# Patient Record
Sex: MALE | Race: WHITE | Employment: FULL TIME | URBAN - METROPOLITAN AREA
[De-identification: names, ages, dates, MRNs, and addresses within clinical notes are randomized per-mention and may not be internally consistent; named-entity substitution may affect disease eponyms.]

---

## 2023-10-31 ENCOUNTER — APPOINTMENT (OUTPATIENT)
Dept: GENERAL RADIOLOGY | Age: 23
End: 2023-10-31
Payer: COMMERCIAL

## 2023-10-31 ENCOUNTER — HOSPITAL ENCOUNTER (EMERGENCY)
Age: 23
Discharge: HOME | End: 2023-10-31
Payer: COMMERCIAL

## 2023-10-31 VITALS
RESPIRATION RATE: 16 BRPM | TEMPERATURE: 98.7 F | SYSTOLIC BLOOD PRESSURE: 135 MMHG | WEIGHT: 180 LBS | OXYGEN SATURATION: 100 % | HEART RATE: 75 BPM | BODY MASS INDEX: 28.93 KG/M2 | DIASTOLIC BLOOD PRESSURE: 87 MMHG | HEIGHT: 66 IN

## 2023-10-31 DIAGNOSIS — M25.571 ACUTE BILATERAL ANKLE PAIN: Primary | ICD-10-CM

## 2023-10-31 DIAGNOSIS — M25.572 ACUTE BILATERAL ANKLE PAIN: Primary | ICD-10-CM

## 2023-10-31 PROCEDURE — 6370000000 HC RX 637 (ALT 250 FOR IP): Performed by: PHYSICIAN ASSISTANT

## 2023-10-31 PROCEDURE — 73610 X-RAY EXAM OF ANKLE: CPT

## 2023-10-31 PROCEDURE — 99283 EMERGENCY DEPT VISIT LOW MDM: CPT

## 2023-10-31 RX ORDER — ESCITALOPRAM OXALATE 20 MG/1
20 TABLET ORAL DAILY
COMMUNITY

## 2023-10-31 RX ORDER — ACETAMINOPHEN 500 MG
1000 TABLET ORAL ONCE
Status: COMPLETED | OUTPATIENT
Start: 2023-10-31 | End: 2023-10-31

## 2023-10-31 RX ORDER — IBUPROFEN 800 MG/1
800 TABLET ORAL ONCE
Status: COMPLETED | OUTPATIENT
Start: 2023-10-31 | End: 2023-10-31

## 2023-10-31 RX ADMIN — IBUPROFEN 800 MG: 800 TABLET, FILM COATED ORAL at 00:46

## 2023-10-31 RX ADMIN — ACETAMINOPHEN 1000 MG: 500 TABLET ORAL at 00:46

## 2023-10-31 ASSESSMENT — PAIN DESCRIPTION - ORIENTATION
ORIENTATION: RIGHT;LEFT
ORIENTATION: RIGHT;LEFT

## 2023-10-31 ASSESSMENT — LIFESTYLE VARIABLES
HOW MANY STANDARD DRINKS CONTAINING ALCOHOL DO YOU HAVE ON A TYPICAL DAY: 1 OR 2
HOW OFTEN DO YOU HAVE A DRINK CONTAINING ALCOHOL: 2-3 TIMES A WEEK

## 2023-10-31 ASSESSMENT — PAIN DESCRIPTION - PAIN TYPE: TYPE: ACUTE PAIN

## 2023-10-31 ASSESSMENT — PAIN DESCRIPTION - LOCATION
LOCATION: ANKLE
LOCATION: ANKLE

## 2023-10-31 ASSESSMENT — PAIN DESCRIPTION - DESCRIPTORS: DESCRIPTORS: SHARP;SHOOTING;ACHING

## 2023-10-31 ASSESSMENT — PAIN SCALES - GENERAL: PAINLEVEL_OUTOF10: 9

## 2023-10-31 ASSESSMENT — PAIN - FUNCTIONAL ASSESSMENT: PAIN_FUNCTIONAL_ASSESSMENT: 0-10
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
## 2023-11-02 ENCOUNTER — TELEPHONE (OUTPATIENT)
Dept: ORTHOPEDIC SURGERY | Age: 23
End: 2023-11-02

## 2023-11-02 NOTE — TELEPHONE ENCOUNTER
General Question     Subject: Teeteerene Owen completion request  Patient and /or Facility Request: [email or web address]  Contact Number: [phone]       *JENNIFER - [email or web address] [email or web address] calling to request the clinic PLEASE complete a Teeteerene Owen at 11.03.2023 appointment. Please advise.

## 2023-11-03 ENCOUNTER — OFFICE VISIT (OUTPATIENT)
Dept: ORTHOPEDIC SURGERY | Age: 23
End: 2023-11-03

## 2023-11-03 VITALS — WEIGHT: 180 LBS | HEIGHT: 66 IN | BODY MASS INDEX: 28.93 KG/M2

## 2023-11-03 DIAGNOSIS — M25.572 ACUTE BILATERAL ANKLE PAIN: Primary | ICD-10-CM

## 2023-11-03 DIAGNOSIS — S82.864A CLOSED NONDISPLACED MAISONNEUVE FRACTURE OF RIGHT LOWER EXTREMITY, INITIAL ENCOUNTER: ICD-10-CM

## 2023-11-03 DIAGNOSIS — S93.402A SPRAIN OF LEFT ANKLE, UNSPECIFIED LIGAMENT, INITIAL ENCOUNTER: ICD-10-CM

## 2023-11-03 DIAGNOSIS — S82.391A: ICD-10-CM

## 2023-11-03 DIAGNOSIS — S82.434A CLOSED NONDISPLACED OBLIQUE FRACTURE OF SHAFT OF RIGHT FIBULA, INITIAL ENCOUNTER: ICD-10-CM

## 2023-11-03 DIAGNOSIS — M25.571 ACUTE BILATERAL ANKLE PAIN: Primary | ICD-10-CM

## 2023-11-04 NOTE — PROGRESS NOTES
Social Determinants of Health     Financial Resource Strain: Not on file   Food Insecurity: Not on file   Transportation Needs: Not on file   Physical Activity: Not on file   Stress: Not on file   Social Connections: Not on file   Intimate Partner Violence: Not on file   Housing Stability: Not on file       Review of Systems: Please see today's intake form for complete review of systems. PHYSICAL EXAM  Gen: awake, alert, oriented  HEENT: atraumatic, normocephalic  Neck: supple  Resp: equal chest rise bilaterally, no audible wheezes, no accessory muscle use. CV: Lower extremities warm and well perfused. Abd: soft, nontender   Focused examination of the right lower extremity: No cuts, wounds, or abrasions. Moderate lower extremity swelling focal to the malleolus region. There is tenderness to palpation along the medial malleolus. There is minor tenderness about the anterolateral ankle in the region of the syndesmosis. There is minor tenderness to palpation about the upper fibular shaft. Ankle range of motion is 5 degrees dorsiflexion to 50 degrees plantarflexion without difficulty. No limitation to subtalar motion. Sensation is intact to light touch in deep peroneal, superficial peroneal, tibial, sural, and saphenous nerve distributions. Motor function is intact to EHL, FHL, tibialis anterior, and gastroc. There is brisk capillary refill to the toes and a strong palpable dorsalis pedis pulse. Compartments are soft and compressible. There is no calf tenderness and a negative Homans' sign. Focused examination of the left lower extremity: No cuts, wounds, or abrasions. Moderate lower extremity swelling focal to the malleolus region. There is minor tenderness about the anterolateral ankle in the region of the syndesmosis. Ankle range of motion is 5 degrees dorsiflexion to 50 degrees plantarflexion without difficulty. No limitation to subtalar motion.  Sensation is intact to light touch in deep

## 2023-12-01 ENCOUNTER — OFFICE VISIT (OUTPATIENT)
Dept: ORTHOPEDIC SURGERY | Age: 23
End: 2023-12-01

## 2023-12-01 VITALS — BODY MASS INDEX: 28.93 KG/M2 | WEIGHT: 180 LBS | HEIGHT: 66 IN

## 2023-12-01 DIAGNOSIS — M25.571 ACUTE BILATERAL ANKLE PAIN: Primary | ICD-10-CM

## 2023-12-01 DIAGNOSIS — M25.572 ACUTE BILATERAL ANKLE PAIN: Primary | ICD-10-CM

## 2023-12-06 NOTE — PROGRESS NOTES
ORTHOPAEDIC SURGERY FOLLOWUP VISIT    CHIEF COMPLAINT:  Bilateral lower extremity injuries    DATE OF INJURY: 10/31/2023    HISTORY OF PRESENT ILLNESS:  25-year-old male presents for repeat evaluation of bilateral lower extremity injuries 4 weeks ago. He sustained a Maisonneuve type injury to the right ankle as well as an ankle sprain to the left. He indicates that his right lower extremity is pain-free. He has minor pain in the left ankle related to the additional burden from nonweightbearing restrictions on the right leg. He has been using OTC medications as needed for pain. He has remained off work at this time. PHYSICAL EXAM:  General: Well-appearing. No distress. Focused examination of the right lower extremity: No cuts, wounds, or abrasions. Mild lower extremity swelling focal to the malleolus region. There is no significant tenderness to palpation along the medial malleolus. There is minor tenderness about the anterolateral ankle in the region of the syndesmosis. There is no tenderness to palpation about the upper fibular shaft. Ankle range of motion is 5 degrees dorsiflexion to 50 degrees plantarflexion without difficulty. No limitation to subtalar motion. Sensation is intact to light touch in deep peroneal, superficial peroneal, tibial, sural, and saphenous nerve distributions. Motor function is intact to EHL, FHL, tibialis anterior, and gastroc. There is brisk capillary refill to the toes and a strong palpable dorsalis pedis pulse. Compartments are soft and compressible. There is no calf tenderness and a negative Homans' sign. Focused examination of the left lower extremity: No cuts, wounds, or abrasions. Moderate lower extremity swelling focal to the malleolus region. There is minor tenderness about the anterolateral ankle in the region of the syndesmosis. Ankle range of motion is 5 degrees dorsiflexion to 50 degrees plantarflexion without difficulty.   No limitation to subtalar

## 2023-12-06 NOTE — PATIENT INSTRUCTIONS
Wire to sleep in the boot. He was provided with rehabilitation exercises to perform for both lower extremities.

## 2024-01-05 ENCOUNTER — OFFICE VISIT (OUTPATIENT)
Dept: ORTHOPEDIC SURGERY | Age: 24
End: 2024-01-05

## 2024-01-05 VITALS — WEIGHT: 180 LBS | HEIGHT: 66 IN | BODY MASS INDEX: 28.93 KG/M2

## 2024-01-05 DIAGNOSIS — S82.864D CLOSED NONDISPLACED MAISONNEUVE FRACTURE OF RIGHT LOWER EXTREMITY WITH ROUTINE HEALING, SUBSEQUENT ENCOUNTER: Primary | ICD-10-CM

## 2024-01-05 DIAGNOSIS — S82.434D CLOSED NONDISPLACED OBLIQUE FRACTURE OF SHAFT OF RIGHT FIBULA WITH ROUTINE HEALING, SUBSEQUENT ENCOUNTER: ICD-10-CM

## 2024-01-05 DIAGNOSIS — S82.391D CLOSED TRAUMATIC NONDISPLACED FRACTURE OF POSTERIOR MALLEOLUS OF RIGHT TIBIA WITH ROUTINE HEALING, SUBSEQUENT ENCOUNTER: ICD-10-CM

## 2024-01-05 DIAGNOSIS — M25.572 ACUTE BILATERAL ANKLE PAIN: ICD-10-CM

## 2024-01-05 DIAGNOSIS — M25.571 ACUTE BILATERAL ANKLE PAIN: ICD-10-CM

## 2024-01-12 ENCOUNTER — TELEPHONE (OUTPATIENT)
Dept: ORTHOPEDIC SURGERY | Age: 24
End: 2024-01-12

## 2024-01-15 ENCOUNTER — HOSPITAL ENCOUNTER (OUTPATIENT)
Dept: PHYSICAL THERAPY | Age: 24
Setting detail: THERAPIES SERIES
Discharge: HOME | End: 2024-01-15
Payer: COMMERCIAL

## 2024-01-15 PROCEDURE — 97140 MANUAL THERAPY 1/> REGIONS: CPT

## 2024-01-15 PROCEDURE — 97161 PT EVAL LOW COMPLEX 20 MIN: CPT

## 2024-01-15 PROCEDURE — 97110 THERAPEUTIC EXERCISES: CPT

## 2024-01-15 NOTE — FLOWSHEET NOTE
[facility]- Outpatient Rehabilitation and Therapy [address], [place] [address] office: [phone] fax: [phone]      Physical Therapy: TREATMENT/PROGRESS NOTE   Patient: Brien Guajardo (23 y.o. male)   Treatment Date: 01/15/2024   :  2000 MRN: [id number]   Visit #: 1    Insurance: Payor: ESIS / Plan: ESIS / Product Type: *No Product type* /   Insurance ID: [id number] - (Worker's Comp)  Secondary Insurance (if applicable):    Treatment Diagnosis: Bilateral ankle pain   Medical Diagnosis:       Closed traumatic nondisplaced fracture of posterior malleolus of right tibia with routine healing, subsequent encounter [S82.391D]  Closed nondisplaced Maisonneuve fracture of right lower extremity with routine healing, subsequent encounter [S82.864D]  Closed nondisplaced oblique fracture of shaft of right fibula with routine healing, subsequent encounter [S82.434D]  Acute bilateral ankle pain [M25.571, M25.572]   Referring Physician: Héctor Hummel MD  PCP: No primary care provider on file.                             Plan of care signed (Y/N):     Date of Patient follow up with Physician:      Progress Report Due: 2/15/24    POC due: 4/15/24     Visit # Insurance Allowable Auth Needed    12- no end date []Yes    []No     Latex Allergy:  [x]NO      []YES   Preferred Language for Healthcare:   [x]English       []other:    Assessment Summary: Brien Guajardo is a 23 y.o. male presenting today to Outpatient PT with primary complaint of bilateral ankle pain which has been occurring since end of October after fall off ladder. Pt is noted to have reduced left ankle ROM. Strength is normal. Mild gait deviation.     SUBJECTIVE EXAMINATION     Patient Report/Comments: see eval    OBJECTIVE EXAMINATION     Observation:     Test measurements:      Test used Initial score 01/15/2024   Pain Summary VAS 4    Functional questionnaire LEFS 26%

## 2024-01-15 NOTE — PLAN OF CARE
[facility]- Outpatient Rehabilitation and Therapy [address], [place] [address] office: [phone] fax: [phone]     Physical Therapy Certification      Dear Héctor Hummel MD,    We had the pleasure of evaluating the following patient for physical therapy services at [facility].  A summary of our findings can be found in the initial assessment below.  This includes our plan of care.  If you have any questions or concerns regarding these findings, please do not hesitate to contact me at the office phone number listed above.  Thank you for the referral.     Physician Signature:_______________________________Date:__________________  By signing above (or electronic signature), therapist’s plan is approved by physician       Physical Therapy: Initial Evaluation   Patient: Brien Guajardo (23 y.o. male)   Examination Date: 01/15/2024   :  2000 MRN: [id number]   Visit #: 1    Insurance: Payor: ESIS / Plan: ESIS / Product Type: *No Product type* /   Insurance ID: [id number] - (Worker's Comp)  Secondary Insurance (if applicable):    Treatment Diagnosis: Bilateral ankle pain     Medical Diagnosis:  Closed traumatic nondisplaced fracture of posterior malleolus of right tibia with routine healing, subsequent encounter [S82.706D]  Closed nondisplaced Maisonneuve fracture of right lower extremity with routine healing, subsequent encounter [S82.604D]  Closed nondisplaced oblique fracture of shaft of right fibula with routine healing, subsequent encounter [S82.647D]  Acute bilateral ankle pain [M25.571, M25.572]   Referring Physician: Héctor uHmmel MD  PCP: No primary care provider on file.                              Precautions/ Contra-indications:   Latex allergy:   [x] NO      [] YES   Pacemaker:     [x] NO      [] YES   Other:     C-SSRS Triggered by Intake questionnaire (Past 2 wk assessment):   [x] No, Questionnaire did not trigger

## 2024-01-19 ENCOUNTER — HOSPITAL ENCOUNTER (OUTPATIENT)
Dept: PHYSICAL THERAPY | Age: 24
Setting detail: THERAPIES SERIES
Discharge: HOME | End: 2024-01-19
Payer: COMMERCIAL

## 2024-01-19 PROCEDURE — 97110 THERAPEUTIC EXERCISES: CPT

## 2024-01-19 PROCEDURE — 97140 MANUAL THERAPY 1/> REGIONS: CPT

## 2024-01-19 PROCEDURE — 97112 NEUROMUSCULAR REEDUCATION: CPT

## 2024-01-23 ENCOUNTER — HOSPITAL ENCOUNTER (OUTPATIENT)
Dept: PHYSICAL THERAPY | Age: 24
Setting detail: THERAPIES SERIES
Discharge: HOME | End: 2024-01-23
Payer: COMMERCIAL

## 2024-01-23 PROCEDURE — 97112 NEUROMUSCULAR REEDUCATION: CPT

## 2024-01-23 PROCEDURE — 97140 MANUAL THERAPY 1/> REGIONS: CPT

## 2024-01-23 PROCEDURE — 97110 THERAPEUTIC EXERCISES: CPT

## 2024-01-23 NOTE — FLOWSHEET NOTE
[facility]- Outpatient Rehabilitation and Therapy [address], [place] [address] office: [phone] fax: [phone]      Physical Therapy: TREATMENT/PROGRESS NOTE   Patient: Brien Guajardo (23 y.o. male)   Treatment Date: 2024   :  2000 MRN: [id number]   Visit #: 3    Insurance: Payor: ESIS / Plan: ESIS / Product Type: *No Product type* /   Insurance ID: [id number] - (Worker's Comp)  Secondary Insurance (if applicable):    Treatment Diagnosis: Bilateral ankle pain   Medical Diagnosis:       Closed traumatic nondisplaced fracture of posterior malleolus of right tibia with routine healing, subsequent encounter [S82.391D]  Closed nondisplaced Maisonneuve fracture of right lower extremity with routine healing, subsequent encounter [S82.864D]  Closed nondisplaced oblique fracture of shaft of right fibula with routine healing, subsequent encounter [S82.434D]  Acute bilateral ankle pain [M25.571, M25.572]   Referring Physician: Héctor Hummel MD  PCP: No primary care provider on file.                             Plan of care signed (Y/N):     Date of Patient follow up with Physician:      Progress Report Due: 2/15/24    POC due: 4/15/24     Visit # Insurance Allowable Auth Needed   3/12 12- no end date []Yes    []No     Latex Allergy:  [x]NO      []YES   Preferred Language for Healthcare:   [x]English       []other:    Assessment Summary: Brien Guajardo is a 23 y.o. male presenting today to Outpatient PT with primary complaint of bilateral ankle pain which has been occurring since end of October after fall off ladder. Pt is noted to have reduced left ankle ROM. Strength is normal. Mild gait deviation.     SUBJECTIVE EXAMINATION     Patient Report/Comments: Pt states right ankle is a little sore. Overall has been doing fine.     OBJECTIVE EXAMINATION     Observation:     Test measurements:      Test used Initial score 2024   Pain Summary VAS 4 1

## 2024-01-26 ENCOUNTER — HOSPITAL ENCOUNTER (OUTPATIENT)
Dept: PHYSICAL THERAPY | Age: 24
Setting detail: THERAPIES SERIES
Discharge: HOME | End: 2024-01-26
Payer: COMMERCIAL

## 2024-01-26 PROCEDURE — 97110 THERAPEUTIC EXERCISES: CPT

## 2024-01-26 PROCEDURE — 97112 NEUROMUSCULAR REEDUCATION: CPT

## 2024-01-26 NOTE — FLOWSHEET NOTE
[facility]- Outpatient Rehabilitation and Therapy [address], [place] [address] office: [phone] fax: [phone]      Physical Therapy: TREATMENT/PROGRESS NOTE   Patient: Brien Guajardo (23 y.o. male)   Treatment Date: 2024   :  2000 MRN: [id number]   Visit #: 4    Insurance: Payor: ESIS / Plan: ESIS / Product Type: *No Product type* /   Insurance ID: [id number] - (Worker's Comp)  Secondary Insurance (if applicable):    Treatment Diagnosis: Bilateral ankle pain   Medical Diagnosis:       Closed traumatic nondisplaced fracture of posterior malleolus of right tibia with routine healing, subsequent encounter [S82.391D]  Closed nondisplaced Maisonneuve fracture of right lower extremity with routine healing, subsequent encounter [S82.864D]  Closed nondisplaced oblique fracture of shaft of right fibula with routine healing, subsequent encounter [S82.434D]  Acute bilateral ankle pain [M25.571, M25.572]   Referring Physician: Héctor Hummel MD  PCP: No primary care provider on file.                             Plan of care signed (Y/N):     Date of Patient follow up with Physician:      Progress Report Due: 2/15/24    POC due: 4/15/24     Visit # Insurance Allowable Auth Needed    12- no end date []Yes    []No     Latex Allergy:  [x]NO      []YES   Preferred Language for Healthcare:   [x]English       []other:    Assessment Summary: Brien Guajardo is a 23 y.o. male presenting today to Outpatient PT with primary complaint of bilateral ankle pain which has been occurring since end of October after fall off ladder. Pt is noted to have reduced left ankle ROM. Strength is normal. Mild gait deviation.     SUBJECTIVE EXAMINATION     Patient Report/Comments: Pt states ankles are a little sore but getting stronger.     OBJECTIVE EXAMINATION     Observation:     Test measurements:      Test used Initial score 2024   Pain Summary VAS 4 2   Functional

## 2024-01-30 ENCOUNTER — HOSPITAL ENCOUNTER (OUTPATIENT)
Dept: PHYSICAL THERAPY | Age: 24
Setting detail: THERAPIES SERIES
Discharge: HOME | End: 2024-01-30
Payer: COMMERCIAL

## 2024-02-02 ENCOUNTER — HOSPITAL ENCOUNTER (OUTPATIENT)
Dept: PHYSICAL THERAPY | Age: 24
Setting detail: THERAPIES SERIES
End: 2024-02-02
Payer: COMMERCIAL

## 2024-02-06 ENCOUNTER — HOSPITAL ENCOUNTER (OUTPATIENT)
Dept: PHYSICAL THERAPY | Age: 24
Setting detail: THERAPIES SERIES
Discharge: HOME | End: 2024-02-06
Payer: COMMERCIAL

## 2024-02-06 PROCEDURE — 97110 THERAPEUTIC EXERCISES: CPT

## 2024-02-06 PROCEDURE — 97016 VASOPNEUMATIC DEVICE THERAPY: CPT

## 2024-02-06 PROCEDURE — 97112 NEUROMUSCULAR REEDUCATION: CPT

## 2024-02-06 NOTE — FLOWSHEET NOTE
impairments and/or activity limitations and would benefit from continued outpatient therapy services to address the deficits outlined in the patients goals            GOALS     Patient stated goal: Return to work  Status: [] Progressing: [] Met: [] Not Met: [] Adjusted     Therapist goals for Patient:   Short Term Goals: To be achieved in: 4 weeks  Independent in HEP and progression per patient tolerance, in order to progress toward full function and prevent re-injury.               Status: [] Progressing: [] Met: [] Not Met: [] Adjusted  Patient will have a decrease in pain to 1/10 to help  facilitate improvement in movement, function, and ADLs as indicated by functional deficits.              Status: [] Progressing: [] Met: [] Not Met: [] Adjusted     Long Term Goals: To be achieved in: 12 weeks  Disability index score of 6% or less for the LEFS to assist with return top prior level of function.                  Status: [] Progressing: [] Met: [] Not Met: [] Adjusted  Pt will improve left ankle DF/INV/EVR to 6/30/20  to allow for proper joint functioning as indicated by patients functional deficits.  Status: [] Progressing: [] Met: [] Not Met: [] Adjusted  Pt will be able to complete 15 SL heel raises  Status: [] Progressing: [] Met: [] Not Met: [] Adjusted  Patient will return to Usual work, housework or activities without increased symptoms or restriction to work towards return to prior level of function.                                  Status: [] Progressing: [] Met: [] Not Met: [] Adjusted  Pt will be able to ascend/descend ladder without limitation.                                                                                                          Status: [] Progressing: [] Met: [] Not Met: [] Adjusted       CHARGE CAPTURE     Time in: 8:20 PM    Time out: 9:15 PM    CPT Code (TIMED) minutes # CPT Code (UNTIMED) #     Therex 05200)    [phone]   EVAL:LOW (59730 - Typically 20 minutes face-to-face)

## 2024-02-09 ENCOUNTER — HOSPITAL ENCOUNTER (OUTPATIENT)
Dept: PHYSICAL THERAPY | Age: 24
Setting detail: THERAPIES SERIES
Discharge: HOME | End: 2024-02-09
Payer: COMMERCIAL

## 2024-02-09 PROCEDURE — 97110 THERAPEUTIC EXERCISES: CPT

## 2024-02-09 PROCEDURE — 97112 NEUROMUSCULAR REEDUCATION: CPT

## 2024-02-09 PROCEDURE — 97016 VASOPNEUMATIC DEVICE THERAPY: CPT

## 2024-02-09 NOTE — FLOWSHEET NOTE
[facility]- Outpatient Rehabilitation and Therapy [address], [place] [address] office: [phone] fax: [phone]      Physical Therapy: TREATMENT/PROGRESS NOTE   Patient: Brien Guajardo (23 y.o. male)   Treatment Date: 2024   :  2000 MRN: [id number]   Visit #: 6    Insurance: Payor: ESIS / Plan: ESIS / Product Type: *No Product type* /   Insurance ID: [id number] - (Worker's Comp)  Secondary Insurance (if applicable):    Treatment Diagnosis: Bilateral ankle pain   Medical Diagnosis:       Closed traumatic nondisplaced fracture of posterior malleolus of right tibia with routine healing, subsequent encounter [S82.391D]  Closed nondisplaced Maisonneuve fracture of right lower extremity with routine healing, subsequent encounter [S82.864D]  Closed nondisplaced oblique fracture of shaft of right fibula with routine healing, subsequent encounter [S82.434D]  Acute bilateral ankle pain [M25.571, M25.572]   Referring Physician: Héctor Hummel MD  PCP: No primary care provider on file.                             Plan of care signed (Y/N):     Date of Patient follow up with Physician:      Progress Report Due: 2/15/24    POC due: 4/15/24     Visit # Insurance Allowable Auth Needed    12- no end date []Yes    []No     Latex Allergy:  [x]NO      []YES   Preferred Language for Healthcare:   [x]English       []other:    Assessment Summary: Brien Guajardo is a 23 y.o. male presenting today to Outpatient PT with primary complaint of bilateral ankle pain which has been occurring since end of October after fall off ladder. Pt is noted to have reduced left ankle ROM. Strength is normal. Mild gait deviation.     SUBJECTIVE EXAMINATION     Patient Report/Comments: Pt states left ankle is sore, right one feels great.     OBJECTIVE EXAMINATION     Observation:     Test measurements:      Test used Initial score 2024   Pain Summary VAS 4 3   Functional

## 2024-02-13 ENCOUNTER — HOSPITAL ENCOUNTER (OUTPATIENT)
Dept: PHYSICAL THERAPY | Age: 24
Setting detail: THERAPIES SERIES
Discharge: HOME | End: 2024-02-13
Payer: COMMERCIAL

## 2024-02-13 PROCEDURE — 97112 NEUROMUSCULAR REEDUCATION: CPT

## 2024-02-13 PROCEDURE — 97110 THERAPEUTIC EXERCISES: CPT

## 2024-02-13 NOTE — FLOWSHEET NOTE
justification of therapy services:  The patient has functional impairments and/or activity limitations and would benefit from continued outpatient therapy services to address the deficits outlined in the patients goals            GOALS     Patient stated goal: Return to work  Status: [] Progressing: [] Met: [] Not Met: [] Adjusted     Therapist goals for Patient:   Short Term Goals: To be achieved in: 4 weeks  Independent in HEP and progression per patient tolerance, in order to progress toward full function and prevent re-injury.               Status: [] Progressing: [] Met: [] Not Met: [] Adjusted  Patient will have a decrease in pain to 1/10 to help  facilitate improvement in movement, function, and ADLs as indicated by functional deficits.              Status: [] Progressing: [] Met: [] Not Met: [] Adjusted     Long Term Goals: To be achieved in: 12 weeks  Disability index score of 6% or less for the LEFS to assist with return top prior level of function.                  Status: [] Progressing: [] Met: [] Not Met: [] Adjusted  Pt will improve left ankle DF/INV/EVR to 6/30/20  to allow for proper joint functioning as indicated by patients functional deficits.  Status: [] Progressing: [] Met: [] Not Met: [] Adjusted  Pt will be able to complete 15 SL heel raises  Status: [] Progressing: [] Met: [] Not Met: [] Adjusted  Patient will return to Usual work, housework or activities without increased symptoms or restriction to work towards return to prior level of function.                                  Status: [] Progressing: [] Met: [] Not Met: [] Adjusted  Pt will be able to ascend/descend ladder without limitation.                                                                                                          Status: [] Progressing: [] Met: [] Not Met: [] Adjusted       CHARGE CAPTURE     Time in: 8:20 PM    Time out: 9:20 PM    CPT Code (TIMED) minutes # CPT Code (UNTIMED) #     Therex (13422)

## 2024-02-16 ENCOUNTER — HOSPITAL ENCOUNTER (OUTPATIENT)
Dept: PHYSICAL THERAPY | Age: 24
Setting detail: THERAPIES SERIES
Discharge: HOME | End: 2024-02-16
Payer: COMMERCIAL

## 2024-02-16 PROCEDURE — 97112 NEUROMUSCULAR REEDUCATION: CPT

## 2024-02-16 PROCEDURE — 97110 THERAPEUTIC EXERCISES: CPT

## 2024-02-16 PROCEDURE — 97016 VASOPNEUMATIC DEVICE THERAPY: CPT

## 2024-02-16 NOTE — FLOWSHEET NOTE
session well. Addition of more dynamic tasks with inversion eversion step ups. Continued vaso to help    Medical Necessity Documentation:  I certify that this patient meets the below criteria necessary for medical necessity for care and/or justification of therapy services:  The patient has functional impairments and/or activity limitations and would benefit from continued outpatient therapy services to address the deficits outlined in the patients goals            GOALS     Patient stated goal: Return to work  Status: [] Progressing: [] Met: [] Not Met: [] Adjusted     Therapist goals for Patient:   Short Term Goals: To be achieved in: 4 weeks  Independent in HEP and progression per patient tolerance, in order to progress toward full function and prevent re-injury.               Status: [] Progressing: [x] Met: [] Not Met: [] Adjusted  Patient will have a decrease in pain to 1/10 to help  facilitate improvement in movement, function, and ADLs as indicated by functional deficits.              Status: [x] Progressing: [] Met: [] Not Met: [] Adjusted     Long Term Goals: To be achieved in: 12 weeks  Disability index score of 6% or less for the LEFS to assist with return top prior level of function.                  Status: [] Progressing: [] Met: [] Not Met: [] Adjusted  Pt will improve left ankle DF/INV/EVR to 6/30/20  to allow for proper joint functioning as indicated by patients functional deficits.  Status: [] Progressing: [] Met: [] Not Met: [] Adjusted  Pt will be able to complete 15 SL heel raises  Status: [] Progressing: [] Met: [] Not Met: [] Adjusted  Patient will return to Usual work, housework or activities without increased symptoms or restriction to work towards return to prior level of function.                                  Status: [] Progressing: [] Met: [] Not Met: [] Adjusted  Pt will be able to ascend/descend ladder without limitation.

## 2024-02-20 ENCOUNTER — HOSPITAL ENCOUNTER (OUTPATIENT)
Dept: PHYSICAL THERAPY | Age: 24
Setting detail: THERAPIES SERIES
End: 2024-02-20
Payer: COMMERCIAL

## 2024-02-23 ENCOUNTER — HOSPITAL ENCOUNTER (OUTPATIENT)
Dept: PHYSICAL THERAPY | Age: 24
Setting detail: THERAPIES SERIES
Discharge: HOME | End: 2024-02-23
Payer: COMMERCIAL

## 2024-02-23 PROCEDURE — 97110 THERAPEUTIC EXERCISES: CPT

## 2024-02-23 PROCEDURE — 97112 NEUROMUSCULAR REEDUCATION: CPT

## 2024-02-23 NOTE — PROGRESS NOTES
[facility] - Orthopaedics and Sports Rehabilitation, [facility]  [address] [place] [address]  Phone: [phone]   Fax: [phone]    Date: 2024          Patient Name; :  Brien Guajardo; 2000   Diagnosis: Closed traumatic nondisplaced fracture of posterior malleolus of right tibia with routine healing, subsequent encounter [S82.391D]  Closed nondisplaced Maisonneuve fracture of right lower extremity with routine healing, subsequent encounter [S82.064D]  Closed nondisplaced oblique fracture of shaft of right fibula with routine healing, subsequent encounter [S82.434D]  Acute bilateral ankle pain [M25.571, M25.572]    Physician: Héctor Hummel MD        Total PT Visits: 9     Measures Previous Current   Pain (0-10)            ROM:  Dorsiflexion: 8  Plantarflexion: 50   Inversion: 34  Eversion: 10        SL Heelraises: x15 B    Assessment:  ROM is improved from eval. Strength is improving. Making nice progress towards goals. Still reporting some discomfort with left ankle.       Prognosis for POC: [x] Good [] Fair  [] Poor      Patient requires continued skilled intervention: [x] Yes  [] No              Plan & Recommendations:  [x] Continue rehabilitation due to objective improvement and continued functional deficits with frequency and duration:   [] Progress toward  []GAP, []Work Conditioning, []Independent HEP   [] Discharge due to   [] All goals achieved, [] Maximized \"medical necessity\" [] No subjective or objective improvements      Electronically signed by:  Oliver Packer, PT  Therapy Plan of Care Re-Certification  This patient has been re-evaluated for physical therapy services and for therapy to continue, Medicare, Medicaid and other insurances require periodic physician review of the treatment plan. Please review the above re-evaluation and verify that you agree with plan of care as established above by signing the attached document and return it to our office or

## 2024-02-23 NOTE — FLOWSHEET NOTE
[facility]- Outpatient Rehabilitation and Therapy [address], [place] [address] office: [phone] fax: [phone]      Physical Therapy: TREATMENT/PROGRESS NOTE   Patient: Brien Guajardo (23 y.o. male)   Treatment Date: 2024   :  2000 MRN: [id number]   Visit #: 9    Insurance: Payor: ESIS / Plan: ESIS / Product Type: *No Product type* /   Insurance ID: [id number] - (Worker's Comp)  Secondary Insurance (if applicable):    Treatment Diagnosis: Bilateral ankle pain   Medical Diagnosis:       Closed traumatic nondisplaced fracture of posterior malleolus of right tibia with routine healing, subsequent encounter [S82.391D]  Closed nondisplaced Maisonneuve fracture of right lower extremity with routine healing, subsequent encounter [S82.864D]  Closed nondisplaced oblique fracture of shaft of right fibula with routine healing, subsequent encounter [S82.434D]  Acute bilateral ankle pain [M25.571, M25.572]   Referring Physician: Héctor Hummel MD  PCP: No primary care provider on file.                             Plan of care signed (Y/N):     Date of Patient follow up with Physician:      Progress Report Due: 2/15/24    POC due: 4/15/24     Visit # Insurance Allowable Auth Needed    12- no end date []Yes    []No     Latex Allergy:  [x]NO      []YES   Preferred Language for Healthcare:   [x]English       []other:    Assessment Summary: Brien Guajardo is a 23 y.o. male presenting today to Outpatient PT with primary complaint of bilateral ankle pain which has been occurring since end of October after fall off ladder. Pt is noted to have reduced left ankle ROM. Strength is normal. Mild gait deviation.     SUBJECTIVE EXAMINATION     Patient Report/Comments: Pt states left ankle is a little more sore today.     OBJECTIVE EXAMINATION     Observation:     Test measurements:      Test used Initial score 2024   Pain Summary VAS 4 2   Functional questionnaire

## 2024-02-27 ENCOUNTER — HOSPITAL ENCOUNTER (OUTPATIENT)
Dept: PHYSICAL THERAPY | Age: 24
Setting detail: THERAPIES SERIES
Discharge: HOME | End: 2024-02-27
Payer: COMMERCIAL

## 2024-02-28 ENCOUNTER — OFFICE VISIT (OUTPATIENT)
Dept: ORTHOPEDIC SURGERY | Age: 24
End: 2024-02-28

## 2024-02-28 VITALS — HEIGHT: 66 IN | BODY MASS INDEX: 28.93 KG/M2 | WEIGHT: 180 LBS

## 2024-02-28 DIAGNOSIS — S82.391D CLOSED TRAUMATIC NONDISPLACED FRACTURE OF POSTERIOR MALLEOLUS OF RIGHT TIBIA WITH ROUTINE HEALING, SUBSEQUENT ENCOUNTER: Primary | ICD-10-CM

## 2024-02-28 DIAGNOSIS — S82.864D CLOSED NONDISPLACED MAISONNEUVE FRACTURE OF RIGHT LOWER EXTREMITY WITH ROUTINE HEALING, SUBSEQUENT ENCOUNTER: ICD-10-CM

## 2024-02-29 NOTE — PROGRESS NOTES
ORTHOPAEDIC SURGERY FOLLOWUP VISIT     CHIEF COMPLAINT:  Bilateral lower extremity injuries     DATE OF INJURY: 10/31/2023     HISTORY OF PRESENT ILLNESS:  22-year-old male presents for repeat evaluation of bilateral lower extremity injuries.  He is now approximately 4 months post injury.  He sustained a Maisonneuve type injury to the right ankle as well as an ankle sprain to the left.  He indicates that his right lower extremity is mostly pain-free.  He has minor pain in the left ankle.  This is mostly across the anterior ankle, which she feels is still limiting.  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  His biggest concern is uneven ground with the left ankle.  He has been using OTC medications as needed for pain.  He has remained off work at this time.      PHYSICAL EXAM:  General: Well-appearing.  No distress.  Focused examination of the right lower extremity: No cuts, wounds, or abrasions.  No appreciable swelling.  There is no significant tenderness to palpation along the medial malleolus.  There is no significant tenderness about the anterolateral ankle in the region of the syndesmosis.  There is no tenderness to palpation about the upper fibular shaft.  Ankle range of motion is 5 degrees dorsiflexion to 50 degrees plantarflexion without difficulty.  No limitation to subtalar motion. Sensation is intact to light touch in deep peroneal, superficial peroneal, tibial, sural, and saphenous nerve distributions.  Motor function is intact to EHL, FHL, tibialis anterior, and gastroc.  There is brisk capillary refill to the toes and a strong palpable dorsalis pedis pulse.  Compartments are soft and compressible.  There is no calf tenderness and a negative Homans' sign.     Focused examination of the left lower extremity: No cuts, wounds, or abrasions.  There is minor tenderness across the anterior ankle.  No significant tenderness along the course of

## 2024-03-01 ENCOUNTER — HOSPITAL ENCOUNTER (OUTPATIENT)
Dept: PHYSICAL THERAPY | Age: 24
Setting detail: THERAPIES SERIES
Discharge: HOME | End: 2024-03-01

## 2024-04-16 ENCOUNTER — OFFICE VISIT (OUTPATIENT)
Dept: ORTHOPEDIC SURGERY | Age: 24
End: 2024-04-16

## 2024-04-16 DIAGNOSIS — S82.864D CLOSED NONDISPLACED MAISONNEUVE FRACTURE OF RIGHT LOWER EXTREMITY WITH ROUTINE HEALING, SUBSEQUENT ENCOUNTER: ICD-10-CM

## 2024-04-16 DIAGNOSIS — M76.822 POSTERIOR TIBIAL TENDONITIS, LEFT: Primary | ICD-10-CM

## 2024-04-16 DIAGNOSIS — S82.391D CLOSED TRAUMATIC NONDISPLACED FRACTURE OF POSTERIOR MALLEOLUS OF RIGHT TIBIA WITH ROUTINE HEALING, SUBSEQUENT ENCOUNTER: ICD-10-CM

## 2024-04-16 NOTE — PROGRESS NOTES
ORTHOPAEDIC SURGERY FOLLOWUP VISIT     CHIEF COMPLAINT:  Bilateral lower extremity injuries     DATE OF INJURY: 10/31/2023     HISTORY OF PRESENT ILLNESS:  22-year-old male presents for repeat evaluation of bilateral lower extremity injuries.  He is now approximately 6 months post injury.  He sustained a Maisonneuve type injury to the right ankle as well as a soft tissue injury to the left ankle.  He indicates that his right lower extremity is mostly pain-free.  He has moderate in the left ankle.  This is mostly along the medial ankle.  He has returned to work full duty, but feels he is limited by the left medial ankle pain.  He reports significant increase in pain with walking on uneven surfaces.      PHYSICAL EXAM:  General: Well-appearing.  No distress.     Focused examination of the left lower extremity: No cuts, wounds, or abrasions.  There is minor tenderness across the anterior ankle.  There is moderate tenderness along the course of the posterior tibial tendon.  No tenderness along peroneal tendons.  There is pain with subtalar motion localizing to the posterior tibial tendon.  Ankle range of motion is 5 degrees dorsiflexion to 50 degrees plantarflexion without difficulty.  No limitation to subtalar motion. Sensation is intact to light touch in deep peroneal, superficial peroneal, tibial, sural, and saphenous nerve distributions.  Motor function is intact to EHL, FHL, tibialis anterior, and gastroc.  There is brisk capillary refill to the toes and a strong palpable dorsalis pedis pulse.  Compartments are soft and compressible.  There is no calf tenderness and a negative Homans' sign.     Gait: Observed ambulating with smooth nonantalgic gait in regular footwear without assist devices.     RADIOGRAPHIC EXAM:  3 views of the right ankle including AP, mortise, lateral projections and full-length AP and lateral tibia/fibula images demonstrate anatomically positioned ankle mortise.  Fracture about the fibular

## 2024-05-29 ENCOUNTER — TELEPHONE (OUTPATIENT)
Dept: ORTHOPEDIC SURGERY | Age: 24
End: 2024-05-29

## 2024-05-29 NOTE — TELEPHONE ENCOUNTER
Medical Facility Question     Facility Name: [facility]  Contact Name: DARIANA  Contact Number: [phone] EXT [phone]  Request or Information: REFAX MRI    DARIANA WITH [facility] CALLED TO RESCHEDULE MRI REFAXED. SHE STATED THAT THE ORIGINAL MRI ORDER-DX CODE IS FOR THE RT ANKLE.    DARIANA PROVIDED [phone]    PLE[facility]

## 2024-06-07 ENCOUNTER — OFFICE VISIT (OUTPATIENT)
Dept: ORTHOPEDIC SURGERY | Age: 24
End: 2024-06-07

## 2024-06-07 VITALS — BODY MASS INDEX: 28.93 KG/M2 | HEIGHT: 66 IN | WEIGHT: 180 LBS

## 2024-06-07 DIAGNOSIS — M76.822 POSTERIOR TIBIAL TENDINITIS, LEFT: ICD-10-CM

## 2024-06-07 DIAGNOSIS — S82.864D CLOSED NONDISPLACED MAISONNEUVE FRACTURE OF RIGHT LOWER EXTREMITY WITH ROUTINE HEALING, SUBSEQUENT ENCOUNTER: ICD-10-CM

## 2024-06-07 DIAGNOSIS — S93.402S SEVERE ANKLE SPRAIN, LEFT, SEQUELA: Primary | ICD-10-CM

## 2025-02-17 ENCOUNTER — TELEPHONE (OUTPATIENT)
Dept: ORTHOPEDIC SURGERY | Age: 25
End: 2025-02-17

## 2025-05-03 ENCOUNTER — HOSPITAL ENCOUNTER (OUTPATIENT)
Age: 25
Setting detail: OBSERVATION
Discharge: HOME | End: 2025-05-03
Attending: EMERGENCY MEDICINE | Admitting: INTERNAL MEDICINE
Payer: COMMERCIAL

## 2025-05-03 ENCOUNTER — APPOINTMENT (OUTPATIENT)
Dept: CT IMAGING | Age: 25
End: 2025-05-03
Payer: COMMERCIAL

## 2025-05-03 VITALS
BODY MASS INDEX: 31.18 KG/M2 | TEMPERATURE: 98.4 F | WEIGHT: 194 LBS | HEART RATE: 61 BPM | RESPIRATION RATE: 16 BRPM | SYSTOLIC BLOOD PRESSURE: 120 MMHG | DIASTOLIC BLOOD PRESSURE: 79 MMHG | HEIGHT: 66 IN | OXYGEN SATURATION: 100 %

## 2025-05-03 DIAGNOSIS — R51.9 ACUTE INTRACTABLE HEADACHE, UNSPECIFIED HEADACHE TYPE: Primary | ICD-10-CM

## 2025-05-03 PROBLEM — G43.919 INTRACTABLE MIGRAINE, UNSPECIFIED MIGRAINE TYPE: Status: ACTIVE | Noted: 2025-05-03

## 2025-05-03 LAB
ALBUMIN SERPL-MCNC: 4.2 G/DL (ref 3.4–5)
ALBUMIN/GLOB SERPL: 1.2 {RATIO} (ref 1.1–2.2)
ALP SERPL-CCNC: 95 U/L (ref 40–129)
ALT SERPL-CCNC: 77 U/L (ref 10–40)
AMPHETAMINES UR QL SCN>1000 NG/ML: ABNORMAL
ANION GAP SERPL CALCULATED.3IONS-SCNC: 14 MMOL/L (ref 3–16)
AST SERPL-CCNC: 39 U/L (ref 15–37)
BARBITURATES UR QL SCN>200 NG/ML: POSITIVE
BASOPHILS # BLD: 0 K/UL (ref 0–0.2)
BASOPHILS NFR BLD: 0.4 %
BENZODIAZ UR QL SCN>200 NG/ML: ABNORMAL
BILIRUB SERPL-MCNC: 0.5 MG/DL (ref 0–1)
BILIRUB UR QL STRIP.AUTO: NEGATIVE
BUN SERPL-MCNC: 19 MG/DL (ref 7–20)
CALCIUM SERPL-MCNC: 9.8 MG/DL (ref 8.3–10.6)
CANNABINOIDS UR QL SCN>50 NG/ML: ABNORMAL
CHLORIDE SERPL-SCNC: 103 MMOL/L (ref 99–110)
CLARITY UR: CLEAR
CO2 SERPL-SCNC: 21 MMOL/L (ref 21–32)
COCAINE UR QL SCN: ABNORMAL
COLOR UR: YELLOW
CREAT SERPL-MCNC: 0.8 MG/DL (ref 0.9–1.3)
DEPRECATED RDW RBC AUTO: 13.4 % (ref 12.4–15.4)
DRUG SCREEN COMMENT UR-IMP: ABNORMAL
EOSINOPHIL # BLD: 0.1 K/UL (ref 0–0.6)
EOSINOPHIL NFR BLD: 1.4 %
FENTANYL SCREEN, URINE: ABNORMAL
GFR SERPLBLD CREATININE-BSD FMLA CKD-EPI: >90 ML/MIN/{1.73_M2}
GLUCOSE SERPL-MCNC: 116 MG/DL (ref 70–99)
GLUCOSE UR STRIP.AUTO-MCNC: NEGATIVE MG/DL
HCT VFR BLD AUTO: 47.6 % (ref 40.5–52.5)
HGB BLD-MCNC: 16.2 G/DL (ref 13.5–17.5)
HGB UR QL STRIP.AUTO: NEGATIVE
KETONES UR STRIP.AUTO-MCNC: NEGATIVE MG/DL
LEUKOCYTE ESTERASE UR QL STRIP.AUTO: NEGATIVE
LYMPHOCYTES # BLD: 2.2 K/UL (ref 1–5.1)
LYMPHOCYTES NFR BLD: 36.4 %
MCH RBC QN AUTO: 29.5 PG (ref 26–34)
MCHC RBC AUTO-ENTMCNC: 34.1 G/DL (ref 31–36)
MCV RBC AUTO: 86.5 FL (ref 80–100)
METHADONE UR QL SCN>300 NG/ML: ABNORMAL
MONOCYTES # BLD: 0.6 K/UL (ref 0–1.3)
MONOCYTES NFR BLD: 9.8 %
NEUTROPHILS # BLD: 3.1 K/UL (ref 1.7–7.7)
NEUTROPHILS NFR BLD: 52 %
NITRITE UR QL STRIP.AUTO: NEGATIVE
OPIATES UR QL SCN>300 NG/ML: ABNORMAL
OXYCODONE UR QL SCN: ABNORMAL
PCP UR QL SCN>25 NG/ML: ABNORMAL
PH UR STRIP.AUTO: 6 [PH] (ref 5–8)
PH UR STRIP: 6 [PH]
PLATELET # BLD AUTO: 184 K/UL (ref 135–450)
PMV BLD AUTO: 9.4 FL (ref 5–10.5)
POTASSIUM SERPL-SCNC: 4.2 MMOL/L (ref 3.5–5.1)
PROT SERPL-MCNC: 7.6 G/DL (ref 6.4–8.2)
PROT UR STRIP.AUTO-MCNC: NEGATIVE MG/DL
RBC # BLD AUTO: 5.51 M/UL (ref 4.2–5.9)
SODIUM SERPL-SCNC: 138 MMOL/L (ref 136–145)
SP GR UR STRIP.AUTO: 1.02 (ref 1–1.03)
TROPONIN, HIGH SENSITIVITY: <6 NG/L (ref 0–22)
TROPONIN, HIGH SENSITIVITY: <6 NG/L (ref 0–22)
UA COMPLETE W REFLEX CULTURE PNL UR: NORMAL
UA DIPSTICK W REFLEX MICRO PNL UR: NORMAL
URN SPEC COLLECT METH UR: NORMAL
UROBILINOGEN UR STRIP-ACNC: 0.2 E.U./DL
WBC # BLD AUTO: 6 K/UL (ref 4–11)

## 2025-05-03 PROCEDURE — 84484 ASSAY OF TROPONIN QUANT: CPT

## 2025-05-03 PROCEDURE — 96372 THER/PROPH/DIAG INJ SC/IM: CPT

## 2025-05-03 PROCEDURE — 80053 COMPREHEN METABOLIC PANEL: CPT

## 2025-05-03 PROCEDURE — 99284 EMERGENCY DEPT VISIT MOD MDM: CPT

## 2025-05-03 PROCEDURE — 99239 HOSP IP/OBS DSCHRG MGMT >30: CPT | Performed by: NURSE PRACTITIONER

## 2025-05-03 PROCEDURE — G0378 HOSPITAL OBSERVATION PER HR: HCPCS

## 2025-05-03 PROCEDURE — 81003 URINALYSIS AUTO W/O SCOPE: CPT

## 2025-05-03 PROCEDURE — 93005 ELECTROCARDIOGRAM TRACING: CPT | Performed by: EMERGENCY MEDICINE

## 2025-05-03 PROCEDURE — 96376 TX/PRO/DX INJ SAME DRUG ADON: CPT

## 2025-05-03 PROCEDURE — 85025 COMPLETE CBC W/AUTO DIFF WBC: CPT

## 2025-05-03 PROCEDURE — 2580000003 HC RX 258: Performed by: EMERGENCY MEDICINE

## 2025-05-03 PROCEDURE — 96361 HYDRATE IV INFUSION ADD-ON: CPT

## 2025-05-03 PROCEDURE — 80307 DRUG TEST PRSMV CHEM ANLYZR: CPT

## 2025-05-03 PROCEDURE — 96374 THER/PROPH/DIAG INJ IV PUSH: CPT

## 2025-05-03 PROCEDURE — 6360000002 HC RX W HCPCS: Performed by: EMERGENCY MEDICINE

## 2025-05-03 PROCEDURE — 70450 CT HEAD/BRAIN W/O DYE: CPT

## 2025-05-03 PROCEDURE — 6370000000 HC RX 637 (ALT 250 FOR IP): Performed by: EMERGENCY MEDICINE

## 2025-05-03 PROCEDURE — 96375 TX/PRO/DX INJ NEW DRUG ADDON: CPT

## 2025-05-03 PROCEDURE — 36415 COLL VENOUS BLD VENIPUNCTURE: CPT

## 2025-05-03 RX ORDER — 0.9 % SODIUM CHLORIDE 0.9 %
1000 INTRAVENOUS SOLUTION INTRAVENOUS ONCE
Status: COMPLETED | OUTPATIENT
Start: 2025-05-03 | End: 2025-05-03

## 2025-05-03 RX ORDER — ENOXAPARIN SODIUM 100 MG/ML
40 INJECTION SUBCUTANEOUS DAILY
Status: DISCONTINUED | OUTPATIENT
Start: 2025-05-03 | End: 2025-05-03 | Stop reason: HOSPADM

## 2025-05-03 RX ORDER — MAGNESIUM SULFATE 1 G/100ML
1000 INJECTION INTRAVENOUS ONCE
Status: DISCONTINUED | OUTPATIENT
Start: 2025-05-03 | End: 2025-05-03 | Stop reason: HOSPADM

## 2025-05-03 RX ORDER — ONDANSETRON 4 MG/1
4 TABLET, ORALLY DISINTEGRATING ORAL 3 TIMES DAILY PRN
Qty: 21 TABLET | Refills: 0 | Status: SHIPPED | OUTPATIENT
Start: 2025-05-03

## 2025-05-03 RX ORDER — SODIUM CHLORIDE 9 MG/ML
INJECTION, SOLUTION INTRAVENOUS PRN
Status: DISCONTINUED | OUTPATIENT
Start: 2025-05-03 | End: 2025-05-03 | Stop reason: HOSPADM

## 2025-05-03 RX ORDER — SODIUM CHLORIDE 0.9 % (FLUSH) 0.9 %
5-40 SYRINGE (ML) INJECTION PRN
Status: DISCONTINUED | OUTPATIENT
Start: 2025-05-03 | End: 2025-05-03 | Stop reason: HOSPADM

## 2025-05-03 RX ORDER — METOCLOPRAMIDE HYDROCHLORIDE 5 MG/ML
10 INJECTION INTRAMUSCULAR; INTRAVENOUS ONCE
Status: COMPLETED | OUTPATIENT
Start: 2025-05-03 | End: 2025-05-03

## 2025-05-03 RX ORDER — POTASSIUM CHLORIDE 7.45 MG/ML
10 INJECTION INTRAVENOUS PRN
Status: DISCONTINUED | OUTPATIENT
Start: 2025-05-03 | End: 2025-05-03 | Stop reason: HOSPADM

## 2025-05-03 RX ORDER — ONDANSETRON 2 MG/ML
4 INJECTION INTRAMUSCULAR; INTRAVENOUS EVERY 6 HOURS PRN
Status: DISCONTINUED | OUTPATIENT
Start: 2025-05-03 | End: 2025-05-03 | Stop reason: HOSPADM

## 2025-05-03 RX ORDER — HALOPERIDOL 5 MG/ML
5 INJECTION INTRAMUSCULAR ONCE
Status: COMPLETED | OUTPATIENT
Start: 2025-05-03 | End: 2025-05-03

## 2025-05-03 RX ORDER — SODIUM CHLORIDE 0.9 % (FLUSH) 0.9 %
5-40 SYRINGE (ML) INJECTION EVERY 12 HOURS SCHEDULED
Status: DISCONTINUED | OUTPATIENT
Start: 2025-05-03 | End: 2025-05-03 | Stop reason: HOSPADM

## 2025-05-03 RX ORDER — ACETAMINOPHEN 650 MG/1
650 SUPPOSITORY RECTAL EVERY 6 HOURS PRN
Status: DISCONTINUED | OUTPATIENT
Start: 2025-05-03 | End: 2025-05-03 | Stop reason: HOSPADM

## 2025-05-03 RX ORDER — KETOROLAC TROMETHAMINE 15 MG/ML
15 INJECTION, SOLUTION INTRAMUSCULAR; INTRAVENOUS ONCE
Status: COMPLETED | OUTPATIENT
Start: 2025-05-03 | End: 2025-05-03

## 2025-05-03 RX ORDER — MAGNESIUM SULFATE IN WATER 40 MG/ML
2000 INJECTION, SOLUTION INTRAVENOUS PRN
Status: DISCONTINUED | OUTPATIENT
Start: 2025-05-03 | End: 2025-05-03 | Stop reason: HOSPADM

## 2025-05-03 RX ORDER — DIPHENHYDRAMINE HYDROCHLORIDE 50 MG/ML
50 INJECTION, SOLUTION INTRAMUSCULAR; INTRAVENOUS ONCE
Status: COMPLETED | OUTPATIENT
Start: 2025-05-03 | End: 2025-05-03

## 2025-05-03 RX ORDER — POTASSIUM CHLORIDE 1500 MG/1
40 TABLET, EXTENDED RELEASE ORAL PRN
Status: DISCONTINUED | OUTPATIENT
Start: 2025-05-03 | End: 2025-05-03 | Stop reason: HOSPADM

## 2025-05-03 RX ORDER — ACETAMINOPHEN 325 MG/1
650 TABLET ORAL EVERY 6 HOURS PRN
Status: DISCONTINUED | OUTPATIENT
Start: 2025-05-03 | End: 2025-05-03 | Stop reason: HOSPADM

## 2025-05-03 RX ORDER — ONDANSETRON 2 MG/ML
4 INJECTION INTRAMUSCULAR; INTRAVENOUS ONCE
Status: COMPLETED | OUTPATIENT
Start: 2025-05-03 | End: 2025-05-03

## 2025-05-03 RX ORDER — ONDANSETRON 4 MG/1
4 TABLET, ORALLY DISINTEGRATING ORAL EVERY 8 HOURS PRN
Status: DISCONTINUED | OUTPATIENT
Start: 2025-05-03 | End: 2025-05-03 | Stop reason: HOSPADM

## 2025-05-03 RX ORDER — POLYETHYLENE GLYCOL 3350 17 G/17G
17 POWDER, FOR SOLUTION ORAL DAILY PRN
Status: DISCONTINUED | OUTPATIENT
Start: 2025-05-03 | End: 2025-05-03 | Stop reason: HOSPADM

## 2025-05-03 RX ORDER — SUMATRIPTAN 6 MG/.5ML
6 INJECTION, SOLUTION SUBCUTANEOUS ONCE
Status: DISCONTINUED | OUTPATIENT
Start: 2025-05-03 | End: 2025-05-03 | Stop reason: HOSPADM

## 2025-05-03 RX ORDER — LORAZEPAM 2 MG/ML
2 INJECTION INTRAMUSCULAR ONCE
Status: COMPLETED | OUTPATIENT
Start: 2025-05-03 | End: 2025-05-03

## 2025-05-03 RX ORDER — SODIUM CHLORIDE 9 MG/ML
INJECTION, SOLUTION INTRAVENOUS CONTINUOUS
Status: DISCONTINUED | OUTPATIENT
Start: 2025-05-03 | End: 2025-05-03 | Stop reason: HOSPADM

## 2025-05-03 RX ORDER — ESCITALOPRAM OXALATE 10 MG/1
20 TABLET ORAL DAILY
Status: DISCONTINUED | OUTPATIENT
Start: 2025-05-04 | End: 2025-05-03 | Stop reason: HOSPADM

## 2025-05-03 RX ORDER — BUTALBITAL, ACETAMINOPHEN AND CAFFEINE 50; 325; 40 MG/1; MG/1; MG/1
1 TABLET ORAL ONCE
Status: COMPLETED | OUTPATIENT
Start: 2025-05-03 | End: 2025-05-03

## 2025-05-03 RX ADMIN — DIPHENHYDRAMINE HYDROCHLORIDE 50 MG: 50 INJECTION INTRAMUSCULAR; INTRAVENOUS at 12:48

## 2025-05-03 RX ADMIN — SODIUM CHLORIDE 1000 ML: 0.9 INJECTION, SOLUTION INTRAVENOUS at 12:47

## 2025-05-03 RX ADMIN — SODIUM CHLORIDE 1000 ML: 0.9 INJECTION, SOLUTION INTRAVENOUS at 16:04

## 2025-05-03 RX ADMIN — METOCLOPRAMIDE 10 MG: 5 INJECTION, SOLUTION INTRAMUSCULAR; INTRAVENOUS at 12:48

## 2025-05-03 RX ADMIN — LORAZEPAM 2 MG: 2 INJECTION INTRAMUSCULAR; INTRAVENOUS at 13:59

## 2025-05-03 RX ADMIN — ONDANSETRON 4 MG: 2 INJECTION, SOLUTION INTRAMUSCULAR; INTRAVENOUS at 12:48

## 2025-05-03 RX ADMIN — HALOPERIDOL LACTATE 5 MG: 5 INJECTION, SOLUTION INTRAMUSCULAR at 14:46

## 2025-05-03 RX ADMIN — ONDANSETRON 4 MG: 2 INJECTION, SOLUTION INTRAMUSCULAR; INTRAVENOUS at 13:59

## 2025-05-03 RX ADMIN — KETOROLAC TROMETHAMINE 15 MG: 15 INJECTION, SOLUTION INTRAMUSCULAR; INTRAVENOUS at 12:48

## 2025-05-03 RX ADMIN — BUTALBITAL, ACETAMINOPHEN, AND CAFFEINE 1 TABLET: 50; 325; 40 TABLET ORAL at 13:07

## 2025-05-03 ASSESSMENT — PAIN - FUNCTIONAL ASSESSMENT: PAIN_FUNCTIONAL_ASSESSMENT: 0-10

## 2025-05-03 ASSESSMENT — PAIN DESCRIPTION - LOCATION: LOCATION: HEAD

## 2025-05-03 ASSESSMENT — LIFESTYLE VARIABLES
HOW OFTEN DO YOU HAVE A DRINK CONTAINING ALCOHOL: 2-4 TIMES A MONTH
HOW MANY STANDARD DRINKS CONTAINING ALCOHOL DO YOU HAVE ON A TYPICAL DAY: 1 OR 2

## 2025-05-03 ASSESSMENT — PAIN SCALES - GENERAL
PAINLEVEL_OUTOF10: 7
PAINLEVEL_OUTOF10: 7

## 2025-05-03 NOTE — ED NOTES
Pt was previously to be admitted however, pt started feeling better. RYLAND Velez saw pt at bedside and was agreeable to d/c patient. Agustin is in the ED for orders to d/c patient. Orders to d/c acknowledged - AVS printed and proceeding with d/c. Verbal discussion with Agustin, unsure how to change dispo.     Dispo changed to d/c

## 2025-05-03 NOTE — ED PROVIDER NOTES
[facility] EMERGENCY DEPARTMENT    EMERGENCY DEPARTMENT ENCOUNTER        Patient Name: Brien Guajardo  MRN: [id number]  Birthdate 2000  Date of evaluation: 5/3/2025  PCP: No primary care provider on file.  Note Started: 3:21 PM EDT 5/3/25    CHIEF COMPLAINT       Headache (Headache behind right eye started 11am; said he started with numbness in his left hand before that and that sent him into a panic attack; also states he threw up on the way here)      HISTORY OF PRESENT ILLNESS: 1 or more Elements       Brien Guajardo is a 24 y.o. male with history of anxiety presents to the emergency department for evaluation of headache.  Patient reports he started having a headache and felt numbness in his left hand.  Felt like he was having a panic attack.  On the way to the ER, he had an episode of vomiting.  Says he still feels somewhat anxious.  Rates his headache as a 8 out of 10.  Reports taking ibuprofen around 11 AM.  Says the headache is behind the right eye and has been stable since onset. Denies any falls or injuries to head. No blood thinners.  No recent fevers, chills, cough, or congestion.    No other complaints, modifying factors or associated symptoms.     History obtained by the patient unless stated otherwise as above on HPI.   No limitations unless specified as above on HPI.     Past medical history:   Past Medical History:   Diagnosis Date    Anxiety     Asthma        Past surgical history: History reviewed. No pertinent surgical history.    Home medications:   Prior to Admission medications    Medication Sig Start Date End Date Taking? Authorizing Provider   escitalopram (LEXAPRO) 20 MG tablet Take 1 tablet by mouth daily   Yes Provider, MD Meghna       Social history:   Social History     Tobacco Use    Smoking status: Every Day     Types: Cigarettes, E-Cigarettes    Smokeless tobacco: Current     Types: Snuff   Vaping Use    Vaping status: Every Day   Substance Use Topics    Alcohol use: Yes      due to:  2+ SIRS criteria are not met     During the patient's ED course, the patient was given:  Medications   sodium chloride 0.9 % bolus 1,000 mL (0 mLs IntraVENous Stopped 5/3/25 1403)   butalbital-acetaminophen-caffeine (FIORICET, ESGIC) per tablet 1 tablet (1 tablet Oral Given 5/3/25 1307)   ondansetron (ZOFRAN) injection 4 mg (4 mg IntraVENous Given 5/3/25 1248)   metoclopramide (REGLAN) injection 10 mg (10 mg IntraVENous Given 5/3/25 1248)   ketorolac (TORADOL) injection 15 mg (15 mg IntraVENous Given 5/3/25 1248)   diphenhydrAMINE (BENADRYL) injection 50 mg (50 mg IntraVENous Given 5/3/25 1248)   LORazepam (ATIVAN) injection 2 mg (2 mg IntraVENous Given 5/3/25 1359)   ondansetron (ZOFRAN) injection 4 mg (4 mg IntraVENous Given 5/3/25 1359)   haloperidol lactate (HALDOL) injection 5 mg (5 mg IntraMUSCular Given 5/3/25 1446)   sodium chloride 0.9 % bolus 1,000 mL (1,000 mLs IntraVENous New Bag 5/3/25 1604)            I am the Primary Clinician of Record.    FINAL IMPRESSION      1. Acute intractable headache, unspecified headache type          DISPOSITION/PLAN     DISPOSITION Decision To Admit 05/03/2025 04:16:27 PM   DISPOSITION CONDITION Stable           PATIENT REFERRED TO:  No follow-up provider specified.    DISCHARGE MEDICATIONS:  Patient was given scripts for the following medications. I counseled patient how to take these medications:  New Prescriptions    No medications on file       DISCONTINUED MEDICATIONS:  Discontinued Medications    No medications on file       Pt was seen during the COVID 19 pandemic. Appropriate PPE worn by ME during patient encounters. Patient was cared for during a time with constrained hospital bed capacity with nationwide stress on resources and staffing.      (This chart was generated in part by using Dragon Dictation system and may contain errors related to that system including errors in grammar, punctuation, and spelling, as well as words and phrases that may be

## 2025-05-03 NOTE — H&P
[facility] Medicine History & Physical    V 5.1    Date of Admission: 5/3/2025    Date of Service:  Pt seen/examined on      []Admitted to Inpatient with expected LOS greater than two midnights due to medical therapy.  [x]Placed in Observation status.    Chief Admission Complaint:  intractable HA     Presenting Admission History:      Brien Guajardo is a 24 y.o. male with history of anxiety presents to the emergency department for evaluation of headache.  Patient reports he started having a headache and felt numbness in his left hand.  Felt like he was having a panic attack.  On the way to the ER, he had an episode of vomiting.  Says he still feels somewhat anxious.  Rates his headache as a 8 out of 10.  Reports taking ibuprofen around 11 AM.  Says the headache is behind the right eye and has been stable since onset. Denies any falls or injuries to head. No blood thinners.  No recent fevers, chills, cough, or congestion.     Assessment/Plan:        Intractable migraine with aura;     P was treated in the ED and due to prolonged turn around and need for continued treatment at the time HM was asked to admit. Whenpt was about to be transferred upstairs reported that he was feeling much better and wanted to go home.   - CT of brain and labs reviewed  - DC to  home with follow  up care to PCP, Rx zofran at [place]     Discussed management and the need for Hospitalization of the patient w/ the Emergency Department Provider:     Physical Exam Performed:      /79   Pulse 60   Temp 98.3 °F (36.8 °C) (Oral)   Resp 16   Ht 1.676 m (5' 6\")   Wt 88 kg (194 lb)   SpO2 100%   BMI 31.31 kg/m²     General appearance:  No apparent distress, appears stated age and cooperative.  HEENT:  Pupils equal, round, and reactive to light. Conjunctivae/corneas clear.  Respiratory:  Normal respiratory effort. Clear to auscultation bilaterally without Rales/Wheezes/Rhonchi.  Cardiovascular:  Regular rate and rhythm with normal S1/S2 without  murmurs, rubs or gallops.  Abdomen:  Soft, non-tender, non-distended with normal bowel sounds.  Musculoskeletal:  No clubbing, cyanosis or edema bilaterally.  Full range of motion without deformity.  Neurologic:  Neurovascularly intact without any focal sensory/motor deficits. Cranial nerves: II-XII intact, grossly non-focal.  Psychiatric:  Alert and oriented, thought content appropriate, normal insight  Skin:  Skin color, texture, turgor normal.  No rashes or lesions.  Capillary Refill:  Brisk,< 3 seconds   Peripheral Pulses:  +2 palpable, equal bilaterally     Diet: [x]Adult/General  []Cardiac  []Diabetic  []Low Fat  []NPO  []NPO after Midnight  []Other     DVT Prophylaxis: PPX dose LMWH    Code status: []Full  []DNR/CCA  []Limited (DNR/CCA with Do Not Intubate)  []DNRCC  Surrogate Decision Maker:   Name Home Phone Work Phone Mobile Phone Relationship Lgl VIRGEN Lee [phone]   Parent    IRVING [phone]   Other         PT/OT Eval Status: Not yet ordered    Anticipated Discharge Day/Date:      Anticipated Discharge Location: Home    Consults:      IP CONSULT TO HOSPITALIST  IP CONSULT TO NEUROLOGY    I personally have obtained, updated and/or reviewed the patient’s medication list on 5/3/2025   --------------------------------------------------------------------------------------------------------------------------------------------------------------------    Imaging:     CT HEAD WO CONTRAST  Result Date: 5/3/2025  EXAMINATION: CT OF THE HEAD WITHOUT CONTRAST  5/3/2025 2:45 pm TECHNIQUE: CT of the head was performed without the administration of intravenous contrast. Automated exposure control, iterative reconstruction, and/or weight based adjustment of the mA/kV was utilized to reduce the radiation dose to as low as reasonably achievable. COMPARISON: None. HISTORY: ORDERING SYSTEM PROVIDED HISTORY: headache FINDINGS: BRAIN/VENTRICLES: There is no acute intracranial hemorrhage, mass

## 2025-05-03 NOTE — DISCHARGE SUMMARY
Hospital Medicine Discharge Summary    Patient: Brien Guajardo   : 2000     Hospital:  [facility]  Admit Date: 5/3/2025   Discharge Date:   5/3/25   Disposition:  Home   Code status:  Full  Condition at Discharge: Stable  Primary Care Provider: No primary care provider on file.    Admitting Provider: Edith Talavera DO  Discharge Provider: Grecia Velez, APRN - CNP     Discharge Diagnoses:      Active Hospital Problems    Diagnosis     Intractable migraine, unspecified migraine type [G43.919]      Chief Admission Complaint:  intractable HA      Presenting Admission History:       Brien Guajardo is a 24 y.o. male with history of anxiety presents to the emergency department for evaluation of headache.  Patient reports he started having a headache and felt numbness in his left hand.  Felt like he was having a panic attack.  On the way to the ER, he had an episode of vomiting.  Says he still feels somewhat anxious.  Rates his headache as a 8 out of 10.  Reports taking ibuprofen around 11 AM.  Says the headache is behind the right eye and has been stable since onset. Denies any falls or injuries to head. No blood thinners.  No recent fevers, chills, cough, or congestion.      Assessment/Plan:          Intractable migraine with aura;      P was treated in the ED and due to prolonged turn around and need for continued treatment at the time HM was asked to admit. Whenpt was about to be transferred upstairs reported that he was feeling much better and wanted to go home.   - CT of brain and labs reviewed  - DC to  home with follow  up care to PCP, Rx zofran at [place]        Patient Discharge Instructions:      Follow up:    1.  Primary Care Provider No primary care provider on file. in the next 1-2 weeks.      The patient was seen and examined on day of discharge and this discharge summary is in conjunction with any daily progress note from day of discharge. Time spent on discharge: 31 minutes in the

## 2025-05-03 NOTE — ED NOTES
Patient's family came to nurse's station and said that patient is feeling better and that he wants to go home. Dr. Davalos has left for the day. Perfect serve message sent to Dr. Talavera.

## 2025-05-04 LAB
EKG ATRIAL RATE: 67 BPM
EKG DIAGNOSIS: NORMAL
EKG P AXIS: 56 DEGREES
EKG P-R INTERVAL: 148 MS
EKG Q-T INTERVAL: 410 MS
EKG QRS DURATION: 92 MS
EKG QTC CALCULATION (BAZETT): 433 MS
EKG R AXIS: 57 DEGREES
EKG T AXIS: 49 DEGREES
EKG VENTRICULAR RATE: 67 BPM